# Patient Record
Sex: MALE | Race: WHITE | NOT HISPANIC OR LATINO | Employment: FULL TIME | URBAN - METROPOLITAN AREA
[De-identification: names, ages, dates, MRNs, and addresses within clinical notes are randomized per-mention and may not be internally consistent; named-entity substitution may affect disease eponyms.]

---

## 2018-09-28 ENCOUNTER — OFFICE VISIT (OUTPATIENT)
Dept: PRIMARY CARE CLINIC | Facility: CLINIC | Age: 23
End: 2018-09-28
Payer: COMMERCIAL

## 2018-09-28 VITALS
HEIGHT: 70 IN | RESPIRATION RATE: 20 BRPM | WEIGHT: 192.88 LBS | DIASTOLIC BLOOD PRESSURE: 78 MMHG | BODY MASS INDEX: 27.61 KG/M2 | TEMPERATURE: 100 F | SYSTOLIC BLOOD PRESSURE: 143 MMHG | HEART RATE: 107 BPM | OXYGEN SATURATION: 99 %

## 2018-09-28 DIAGNOSIS — L02.211 CUTANEOUS ABSCESS OF ABDOMINAL WALL: Primary | ICD-10-CM

## 2018-09-28 PROCEDURE — 99999 PR PBB SHADOW E&M-NEW PATIENT-LVL IV: CPT | Mod: PBBFAC,,, | Performed by: NURSE PRACTITIONER

## 2018-09-28 PROCEDURE — 10060 I&D ABSCESS SIMPLE/SINGLE: CPT | Mod: S$GLB,,, | Performed by: NURSE PRACTITIONER

## 2018-09-28 PROCEDURE — 99202 OFFICE O/P NEW SF 15 MIN: CPT | Mod: 25,S$GLB,, | Performed by: NURSE PRACTITIONER

## 2018-09-28 PROCEDURE — 87070 CULTURE OTHR SPECIMN AEROBIC: CPT

## 2018-09-28 PROCEDURE — 3008F BODY MASS INDEX DOCD: CPT | Mod: CPTII,S$GLB,, | Performed by: NURSE PRACTITIONER

## 2018-09-28 RX ORDER — SULFAMETHOXAZOLE AND TRIMETHOPRIM 800; 160 MG/1; MG/1
1 TABLET ORAL 2 TIMES DAILY
Qty: 14 TABLET | Refills: 0 | Status: SHIPPED | OUTPATIENT
Start: 2018-09-28 | End: 2018-10-05

## 2018-09-28 RX ORDER — MUPIROCIN 20 MG/G
OINTMENT TOPICAL 2 TIMES DAILY
Qty: 22 G | Refills: 0 | Status: SHIPPED | OUTPATIENT
Start: 2018-09-28

## 2018-09-28 RX ORDER — LORATADINE 10 MG/1
10 TABLET ORAL DAILY
COMMUNITY

## 2018-09-28 NOTE — PROCEDURES
"Incision & Drainage  Date/Time: 9/28/2018 1:10 PM  Performed by: Mary Luke DNP, NP-C  Authorized by: Mary Luke DNP, NP-C     Time out: Immediately prior to procedure a "time out" was called to verify the correct patient, procedure, equipment, support staff and site/side marked as required.    Consent Done?:  Yes (Verbal)    Type:  Abscess  Body area:  Trunk  Location details:  Abdomen  Incision type:  Single straight  Complexity:  Simple  Drainage:  Purulent  Drainage amount:  Scant  Wound treatment:  Expression of material  Packing material:  None  Patient tolerance:  Patient tolerated the procedure well with no immediate complications    Culture obtained and sent to lab.  Dressing applied in clinic.  Wound care reviewed with patient.       "

## 2018-10-01 LAB — BACTERIA SPEC AEROBE CULT: NO GROWTH

## 2025-08-08 ENCOUNTER — APPOINTMENT (OUTPATIENT)
Dept: URBAN - METROPOLITAN AREA CLINIC 15 | Facility: CLINIC | Age: 30
Setting detail: DERMATOLOGY
End: 2025-08-08

## 2025-08-08 DIAGNOSIS — D22 MELANOCYTIC NEVI: ICD-10-CM

## 2025-08-08 DIAGNOSIS — D18.0 HEMANGIOMA: ICD-10-CM

## 2025-08-08 DIAGNOSIS — L81.4 OTHER MELANIN HYPERPIGMENTATION: ICD-10-CM

## 2025-08-08 DIAGNOSIS — D485 NEOPLASM OF UNCERTAIN BEHAVIOR OF SKIN: ICD-10-CM | Status: WORSENING

## 2025-08-08 PROBLEM — D22.5 MELANOCYTIC NEVI OF TRUNK: Status: ACTIVE | Noted: 2025-08-08

## 2025-08-08 PROBLEM — D48.5 NEOPLASM OF UNCERTAIN BEHAVIOR OF SKIN: Status: ACTIVE | Noted: 2025-08-08

## 2025-08-08 PROBLEM — D18.01 HEMANGIOMA OF SKIN AND SUBCUTANEOUS TISSUE: Status: ACTIVE | Noted: 2025-08-08

## 2025-08-08 PROCEDURE — ?

## 2025-08-08 PROCEDURE — ?: Mod: 25

## 2025-08-08 PROCEDURE — ? SHAVE REMOVAL

## 2025-08-08 PROCEDURE — ? COUNSELING

## 2025-08-08 ASSESSMENT — LOCATION DETAILED DESCRIPTION DERM
LOCATION DETAILED: LEFT SUPERIOR LATERAL UPPER BACK
LOCATION DETAILED: LEFT PROXIMAL PRETIBIAL REGION
LOCATION DETAILED: INFERIOR THORACIC SPINE
LOCATION DETAILED: SUPERIOR THORACIC SPINE
LOCATION DETAILED: SUPERIOR LUMBAR SPINE

## 2025-08-08 ASSESSMENT — LOCATION SIMPLE DESCRIPTION DERM
LOCATION SIMPLE: UPPER BACK
LOCATION SIMPLE: LEFT PRETIBIAL REGION
LOCATION SIMPLE: LOWER BACK
LOCATION SIMPLE: LEFT UPPER BACK

## 2025-08-08 ASSESSMENT — LOCATION ZONE DERM
LOCATION ZONE: TRUNK
LOCATION ZONE: LEG